# Patient Record
(demographics unavailable — no encounter records)

---

## 2025-06-24 NOTE — ASSESSMENT
[Vaccines Reviewed] : Immunizations reviewed today. Please see immunization details in the vaccine log within the immunization flowsheet.  [FreeTextEntry1] : Discussed diet and exercise.  He has lost 14 pounds.  The BP is a little better at 120/84.  Check lipids- he continues to decline a statin.    He agrees to do a calcium CT to evaluate.  He says he submitted a Cologuard but no report received and no sample received by the company.  Will order it again.  Check a PSA.  No  symptoms.  Check a U/A.  He declines Shingrix.    Check a HgBA1c- last 5.8

## 2025-06-24 NOTE — HEALTH RISK ASSESSMENT
[No falls in past year] : Patient reported no falls in the past year [0] : 2) Feeling down, depressed, or hopeless: Not at all (0) [Fully functional (bathing, dressing, toileting, transferring, walking, feeding)] : Fully functional (bathing, dressing, toileting, transferring, walking, feeding) [Fully functional (using the telephone, shopping, preparing meals, housekeeping, doing laundry, using] : Fully functional and needs no help or supervision to perform IADLs (using the telephone, shopping, preparing meals, housekeeping, doing laundry, using transportation, managing medications and managing finances) [[id number]] : 0

## 2025-06-24 NOTE — HISTORY OF PRESENT ILLNESS
[FreeTextEntry1] : The patient is here for a routine visit.  [de-identified] : He feels well.  He is doing some exercise.  His diet is a little better and he has lost 14 pounds.  No chest pain, dyspnea.  No urinary symptoms.  He had LBP which is improved now.